# Patient Record
Sex: FEMALE | Race: WHITE | NOT HISPANIC OR LATINO | Employment: FULL TIME | URBAN - METROPOLITAN AREA
[De-identification: names, ages, dates, MRNs, and addresses within clinical notes are randomized per-mention and may not be internally consistent; named-entity substitution may affect disease eponyms.]

---

## 2017-03-20 ENCOUNTER — HOSPITAL ENCOUNTER (EMERGENCY)
Facility: MEDICAL CENTER | Age: 42
End: 2017-03-20
Attending: EMERGENCY MEDICINE
Payer: COMMERCIAL

## 2017-03-20 VITALS
RESPIRATION RATE: 16 BRPM | HEART RATE: 96 BPM | HEIGHT: 59 IN | SYSTOLIC BLOOD PRESSURE: 119 MMHG | OXYGEN SATURATION: 99 % | TEMPERATURE: 97.7 F | BODY MASS INDEX: 25.2 KG/M2 | DIASTOLIC BLOOD PRESSURE: 70 MMHG | WEIGHT: 125 LBS

## 2017-03-20 DIAGNOSIS — K02.9 DENTAL CARIES: ICD-10-CM

## 2017-03-20 PROCEDURE — 99283 EMERGENCY DEPT VISIT LOW MDM: CPT

## 2017-03-20 RX ORDER — HYDROCODONE BITARTRATE AND ACETAMINOPHEN 5; 325 MG/1; MG/1
1-2 TABLET ORAL EVERY 6 HOURS PRN
Qty: 10 TAB | Refills: 0 | Status: SHIPPED | OUTPATIENT
Start: 2017-03-20 | End: 2018-02-02

## 2017-03-20 RX ORDER — PENICILLIN V POTASSIUM 500 MG/1
500 TABLET ORAL
Qty: 20 TAB | Refills: 0 | Status: SHIPPED | OUTPATIENT
Start: 2017-03-20 | End: 2017-03-25

## 2017-03-20 ASSESSMENT — PAIN SCALES - GENERAL: PAINLEVEL_OUTOF10: 0

## 2017-03-20 NOTE — ED AVS SNAPSHOT
OpenNews Access Code: [email or web address]  Expires: 4/19/2017 10:45 AM    Your email address is not on file at [facility].  Email Addresses are required for you to sign up for OpenNews, please contact [phone] to verify your personal information and to provide your email address prior to attempting to register for OpenNews.    Adriane Hickman  [address] [address]    Korut  A secure, online tool to manage your health information     [facility]’s OpenNews® is a secure, online tool that connects you to your personalized health information from the privacy of your home -- day or night - making it very easy for you to manage your healthcare. Once the activation process is completed, you can even access your medical information using the OpenNews artie, which is available for free in the Apple Artie store or Google Play store.     To learn more about OpenNews, visit [email or web address]    There are two levels of access available (as shown below):   My Chart Features  [facility] Primary Care Doctor [facility]  Specialists [facility]  Urgent  Care Non-[facility] Primary Care Doctor   Email your healthcare team securely and privately 24/7 X X X    Manage appointments: schedule your next appointment; view details of past/upcoming appointments X      Request prescription refills. X      View recent personal medical records, including lab and immunizations X X X X   View health record, including health history, allergies, medications X X X X   Read reports about your outpatient visits, procedures, consult and ER notes X X X X   See your discharge summary, which is a recap of your hospital and/or ER visit that includes your diagnosis, lab results, and care plan X X  X     How to register for Korut:  Once your e-mail address has been verified, follow the following steps to sign up for Korut.     1. Go to  [email or web address]  2. Click on the Sign Up Now box, which takes you to the New Member Sign Up  page. You will need to provide the following information:  a. Enter your [employer] Access Code exactly as it appears at the top of this page. (You will not need to use this code after you’ve completed the sign-up process. If you do not sign up before the expiration date, you must request a new code.)   b. Enter your date of birth.   c. Enter your home email address.   d. Click Submit, and follow the next screen’s instructions.  3. Create a [employer] ID. This will be your [employer] login ID and cannot be changed, so think of one that is secure and easy to remember.  4. Create a [employer] password. You can change your password at any time.  5. Enter your Password Reset Question and Answer. This can be used at a later time if you forget your password.   6. Enter your e-mail address. This allows you to receive e-mail notifications when new information is available in [employer].  7. Click Sign Up. You can now view your health information.    For assistance activating your [employer] account, call [phone]

## 2017-03-20 NOTE — ED AVS SNAPSHOT
3/20/2017          Adriane Hickman  [address]  [facility] [address]    Dear Adriane:    Critical access hospital wants to ensure your discharge home is safe and you or your loved ones have had all your questions answered regarding your care after you leave the hospital.    You may receive a telephone call within two days of your discharge.  This call is to make certain you understand your discharge instructions as well as ensure we provided you with the best care possible during your stay with us.     The call will only last approximately 3-5 minutes and will be done by a nurse.    Once again, we want to ensure your discharge home is safe and that you have a clear understanding of any next steps in your care.  If you have any questions or concerns, please do not hesitate to contact us, we are here for you.  Thank you for choosing [facility] for your healthcare needs.    Sincerely,    Anton Diaz    [facility]

## 2017-03-20 NOTE — DISCHARGE INSTRUCTIONS
Dental Caries  Dental caries (also called tooth decay) is the most common oral disease. It can occur at any age but is more common in children and young adults.   HOW DENTAL CARIES DEVELOPS   The process of decay begins when bacteria and foods (particularly sugars and starches) combine in your mouth to produce plaque. Plaque is a substance that sticks to the hard, outer surface of a tooth (enamel). The bacteria in plaque produce acids that attack enamel. These acids may also attack the root surface of a tooth (cementum) if it is exposed. Repeated attacks dissolve these surfaces and create holes in the tooth (cavities). If left untreated, the acids destroy the other layers of the tooth.   RISK FACTORS  · Frequent sipping of sugary beverages.    · Frequent snacking on sugary and starchy foods, especially those that easily get stuck in the teeth.    · Poor oral hygiene.    · Dry mouth.    · Substance abuse such as methamphetamine abuse.    · Broken or poor-fitting dental restorations.    · Eating disorders.    · Gastroesophageal reflux disease (GERD).    · Certain radiation treatments to the head and neck.  SYMPTOMS  In the early stages of dental caries, symptoms are seldom present. Sometimes white, chalky areas may be seen on the enamel or other tooth layers. In later stages, symptoms may include:  · Pits and holes on the enamel.  · Toothache after sweet, hot, or cold foods or drinks are consumed.  · Pain around the tooth.  · Swelling around the tooth.  DIAGNOSIS   Most of the time, dental caries is detected during a regular dental checkup. A diagnosis is made after a thorough medical and dental history is taken and the surfaces of your teeth are checked for signs of dental caries. Sometimes special instruments, such as lasers, are used to check for dental caries. Dental X-ray exams may be taken so that areas not visible to the eye (such as between the contact areas of the teeth) can be checked for cavities.    TREATMENT   If dental caries is in its early stages, it may be reversed with a fluoride treatment or an application of a remineralizing agent at the dental office. Thorough brushing and flossing at home is needed to aid these treatments. If it is in its later stages, treatment depends on the location and extent of tooth destruction:   · If a small area of the tooth has been destroyed, the destroyed area will be removed and cavities will be filled with a material such as gold, silver amalgam, or composite resin.    · If a large area of the tooth has been destroyed, the destroyed area will be removed and a cap (crown) will be fitted over the remaining tooth structure.    · If the center part of the tooth (pulp) is affected, a procedure called a root canal will be needed before a filling or crown can be placed.    · If most of the tooth has been destroyed, the tooth may need to be pulled (extracted).  HOME CARE INSTRUCTIONS  You can prevent, stop, or reverse dental caries at home by practicing good oral hygiene. Good oral hygiene includes:  · Thoroughly cleaning your teeth at least twice a day with a toothbrush and dental floss.    · Using a fluoride toothpaste. A fluoride mouth rinse may also be used if recommended by your dentist or health care provider.    · Restricting the amount of sugary and starchy foods and sugary liquids you consume.    · Avoiding frequent snacking on these foods and sipping of these liquids.    · Keeping regular visits with a dentist for checkups and cleanings.  PREVENTION   · Practice good oral hygiene.  · Consider a dental sealant. A dental sealant is a coating material that is applied by your dentist to the pits and grooves of teeth. The sealant prevents food from being trapped in them. It may protect the teeth for several years.  · Ask about fluoride supplements if you live in a community without fluorinated water or with water that has a low fluoride content. Use fluoride supplements  as directed by your dentist or health care provider.  · Allow fluoride varnish applications to teeth if directed by your dentist or health care provider.     This information is not intended to replace advice given to you by your health care provider. Make sure you discuss any questions you have with your health care provider.     Document Released: 09/09/2003 Document Revised: 01/08/2016 Document Reviewed: 12/20/2013  [employer] Interactive Patient Education ©2016 [employer]    Dental Extraction  A dental extraction is the removal (extraction) of a tooth. You may need to have a dental extraction if:   · You have tooth decay or gum disease.  · You have an infection (abscess).  · Room needs to be made for other teeth to grow in or to be aligned properly.  · Baby (primary) teeth are preventing adult (permanent) teeth from coming to the surface (erupting).  · You have a tooth fracture or fractures that are not repairable.  · You are going to be having radiation to your head and neck.  The type and length of procedure that you have depends on the reason for the extraction and the placement of the tooth or teeth that are being removed. The procedure may be:  · A simple extraction. This is done if the tooth is visible in the mouth and is above the gumline.  · A surgical extraction. This is done if the tooth has not come into the mouth or if the tooth is broken off below the gumline.  LET YOUR HEALTH CARE PROVIDER KNOW ABOUT:  · Any allergies you have.  · All medicines you are taking, including vitamins, herbs, eye drops, creams, and over-the-counter medicines.  · Previous problems you or members of your family have had with the use of anesthetics.  · Any blood disorders you have.  · Previous surgeries you have had.  · Any medical conditions you may have.  RISKS AND COMPLICATIONS  Generally, this is a safe procedure. However, problems may occur, including:  · Damage to surrounding teeth, nerves, tissues, or  structures.  · The blood clot does not form or stay in place where the tooth was removed. This causes the bones and nerves underneath to be exposed (dry socket). This can delay healing.  · Incomplete extraction of roots.  · Jawbone injury, pain, or weakness.  BEFORE THE PROCEDURE  · Ask your health care provider about:  ¨ Changing or stopping your regular medicines. This is especially important if you are taking diabetes medicines or blood thinners.  ¨ Taking medicines such as aspirin and ibuprofen. These medicines can thin your blood. Do not take these medicines before your procedure if your health care provider instructs you not to.  · Take medicines, such as antibiotic medicines, as directed by your health care provider.  · Follow instructions from your health care provider about eating or drinking restrictions.  · Plan to have someone take you home after the procedure.  · If you go home right after the procedure, plan to have someone with you for 24 hours.  PROCEDURE  · You may be given one or more of the following:  ¨ A medicine that helps you relax (sedative).  ¨ A medicine that numbs the area (local anesthetic).  ¨ A medicine that makes you fall asleep (general anesthetic).  · If you are having a simple extraction:  ¨ Your dentist will loosen the tooth with an instrument called an elevator.  ¨ Another instrument called forceps will be used to grasp the tooth and remove it from the socket.  ¨ The open socket will be cleaned.  ¨ Gauze will be placed in the socket to reduce bleeding.  · If you are having a surgical extraction:  ¨ Your dentist will make an incision in the gum.  ¨ Some of the bone around the tooth may need to be removed.  ¨ The tooth will be removed.  ¨ Stitches (sutures) may be required to close the area.  The procedure may vary among health care providers and hospitals.  AFTER THE PROCEDURE  · You may have gauze in your mouth where the tooth was removed. If directed by your health care provider,  apply gentle pressure on the gauze for up to one hour after the procedure. This will help to control bleeding.  · A blood clot should begin to form over the open socket. This is normal. Do not touch the area, and do not rinse it.  · You may be given medicines to help control pain and help your recovery.     This information is not intended to replace advice given to you by your health care provider. Make sure you discuss any questions you have with your health care provider.     Document Released: 12/18/2006 Document Revised: 05/03/2016 Document Reviewed: 12/14/2015  [employer] Interactive Patient Education ©2016 [employer]  Return if fever, vomiting or if no better in 12 hours.Toothache    Follow up with [facility]. Return if fever of facial swelling or if no better in 12 hours  Return if no better in 12 hours. Follow up with [facility] clinic. Telephone [phone].  Return if no better in 12 hours.    Your exam shows that your toothache is probably due to tooth decay and infection. Poor dental care is the main cause of this problem. Swelling and redness around a painful tooth often means you have a dental abscess.    Pain medicine and antibiotics can help reduce symptoms, but you will need to see a dentist within the next few days to have your problem properly treated. Fillings or root canal work may be needed to save your tooth. If the problem is severe, your tooth may need to be pulled. Please call your doctor or go to the emergency room  if you have a fever over 101F, can't swallow, or develop severe swelling.    [employer]® Patient Information ©2007 [employer].

## 2017-03-20 NOTE — ED AVS SNAPSHOT
Home Care Instructions                                                                                                                Adriane Hickman   MRN: [id number]    Department:  [facility], Emergency Dept   Date of Visit:  3/20/2017            [facility], Emergency Dept    [address]    [place] [address]    Phone:  [phone]      You were seen by     Addison Morrison M.D.      Your Diagnosis Was     Dental caries     K02.9       Follow-up Information     1. Follow up with [facility]. Schedule an appointment as soon as possible for a visit today.    Why:  go to [facility] now, immediately, today    Contact information    [address]  [address]  [facility] [address]  [phone]        Medication Information     Review all of your home medications and newly ordered medications with your primary doctor and/or pharmacist as soon as possible. Follow medication instructions as directed by your doctor and/or pharmacist.     Please keep your complete medication list with you and share with your physician. Update the information when medications are discontinued, doses are changed, or new medications (including over-the-counter products) are added; and carry medication information at all times in the event of emergency situations.               Medication List      START taking these medications        Instructions    Morning Afternoon Evening Bedtime    hydrocodone-acetaminophen 5-325 MG Tabs per tablet   Commonly known as:  NORCO        Take 1-2 Tabs by mouth every 6 hours as needed.   Dose:  1-2 Tab                        penicillin v potassium 500 MG Tabs   Commonly known as:  VEETID        Take 1 Tab by mouth 4 Times a Day,Before Meals and at Bedtime for 5 days.   Dose:  500 mg                             Where to Get Your Medications      You can get these medications from any pharmacy     Bring a paper prescription for each of these medications    -  hydrocodone-acetaminophen 5-325 MG Tabs per tablet  - penicillin v potassium 500 MG Tabs              Discharge Instructions       Dental Caries  Dental caries (also called tooth decay) is the most common oral disease. It can occur at any age but is more common in children and young adults.   HOW DENTAL CARIES DEVELOPS   The process of decay begins when bacteria and foods (particularly sugars and starches) combine in your mouth to produce plaque. Plaque is a substance that sticks to the hard, outer surface of a tooth (enamel). The bacteria in plaque produce acids that attack enamel. These acids may also attack the root surface of a tooth (cementum) if it is exposed. Repeated attacks dissolve these surfaces and create holes in the tooth (cavities). If left untreated, the acids destroy the other layers of the tooth.   RISK FACTORS  · Frequent sipping of sugary beverages.    · Frequent snacking on sugary and starchy foods, especially those that easily get stuck in the teeth.    · Poor oral hygiene.    · Dry mouth.    · Substance abuse such as methamphetamine abuse.    · Broken or poor-fitting dental restorations.    · Eating disorders.    · Gastroesophageal reflux disease (GERD).    · Certain radiation treatments to the head and neck.  SYMPTOMS  In the early stages of dental caries, symptoms are seldom present. Sometimes white, chalky areas may be seen on the enamel or other tooth layers. In later stages, symptoms may include:  · Pits and holes on the enamel.  · Toothache after sweet, hot, or cold foods or drinks are consumed.  · Pain around the tooth.  · Swelling around the tooth.  DIAGNOSIS   Most of the time, dental caries is detected during a regular dental checkup. A diagnosis is made after a thorough medical and dental history is taken and the surfaces of your teeth are checked for signs of dental caries. Sometimes special instruments, such as lasers, are used to check for dental caries. Dental X-ray exams may be  taken so that areas not visible to the eye (such as between the contact areas of the teeth) can be checked for cavities.   TREATMENT   If dental caries is in its early stages, it may be reversed with a fluoride treatment or an application of a remineralizing agent at the dental office. Thorough brushing and flossing at home is needed to aid these treatments. If it is in its later stages, treatment depends on the location and extent of tooth destruction:   · If a small area of the tooth has been destroyed, the destroyed area will be removed and cavities will be filled with a material such as gold, silver amalgam, or composite resin.    · If a large area of the tooth has been destroyed, the destroyed area will be removed and a cap (crown) will be fitted over the remaining tooth structure.    · If the center part of the tooth (pulp) is affected, a procedure called a root canal will be needed before a filling or crown can be placed.    · If most of the tooth has been destroyed, the tooth may need to be pulled (extracted).  HOME CARE INSTRUCTIONS  You can prevent, stop, or reverse dental caries at home by practicing good oral hygiene. Good oral hygiene includes:  · Thoroughly cleaning your teeth at least twice a day with a toothbrush and dental floss.    · Using a fluoride toothpaste. A fluoride mouth rinse may also be used if recommended by your dentist or health care provider.    · Restricting the amount of sugary and starchy foods and sugary liquids you consume.    · Avoiding frequent snacking on these foods and sipping of these liquids.    · Keeping regular visits with a dentist for checkups and cleanings.  PREVENTION   · Practice good oral hygiene.  · Consider a dental sealant. A dental sealant is a coating material that is applied by your dentist to the pits and grooves of teeth. The sealant prevents food from being trapped in them. It may protect the teeth for several years.  · Ask about fluoride supplements if  you live in a community without fluorinated water or with water that has a low fluoride content. Use fluoride supplements as directed by your dentist or health care provider.  · Allow fluoride varnish applications to teeth if directed by your dentist or health care provider.     This information is not intended to replace advice given to you by your health care provider. Make sure you discuss any questions you have with your health care provider.     Document Released: 09/09/2003 Document Revised: 01/08/2016 Document Reviewed: 12/20/2013  [employer] Interactive Patient Education ©2016 Else[employer] Inc.    Dental Extraction  A dental extraction is the removal (extraction) of a tooth. You may need to have a dental extraction if:   · You have tooth decay or gum disease.  · You have an infection (abscess).  · Room needs to be made for other teeth to grow in or to be aligned properly.  · Baby (primary) teeth are preventing adult (permanent) teeth from coming to the surface (erupting).  · You have a tooth fracture or fractures that are not repairable.  · You are going to be having radiation to your head and neck.  The type and length of procedure that you have depends on the reason for the extraction and the placement of the tooth or teeth that are being removed. The procedure may be:  · A simple extraction. This is done if the tooth is visible in the mouth and is above the gumline.  · A surgical extraction. This is done if the tooth has not come into the mouth or if the tooth is broken off below the gumline.  LET YOUR HEALTH CARE PROVIDER KNOW ABOUT:  · Any allergies you have.  · All medicines you are taking, including vitamins, herbs, eye drops, creams, and over-the-counter medicines.  · Previous problems you or members of your family have had with the use of anesthetics.  · Any blood disorders you have.  · Previous surgeries you have had.  · Any medical conditions you may have.  RISKS AND COMPLICATIONS  Generally, this is a  safe procedure. However, problems may occur, including:  · Damage to surrounding teeth, nerves, tissues, or structures.  · The blood clot does not form or stay in place where the tooth was removed. This causes the bones and nerves underneath to be exposed (dry socket). This can delay healing.  · Incomplete extraction of roots.  · Jawbone injury, pain, or weakness.  BEFORE THE PROCEDURE  · Ask your health care provider about:  ¨ Changing or stopping your regular medicines. This is especially important if you are taking diabetes medicines or blood thinners.  ¨ Taking medicines such as aspirin and ibuprofen. These medicines can thin your blood. Do not take these medicines before your procedure if your health care provider instructs you not to.  · Take medicines, such as antibiotic medicines, as directed by your health care provider.  · Follow instructions from your health care provider about eating or drinking restrictions.  · Plan to have someone take you home after the procedure.  · If you go home right after the procedure, plan to have someone with you for 24 hours.  PROCEDURE  · You may be given one or more of the following:  ¨ A medicine that helps you relax (sedative).  ¨ A medicine that numbs the area (local anesthetic).  ¨ A medicine that makes you fall asleep (general anesthetic).  · If you are having a simple extraction:  ¨ Your dentist will loosen the tooth with an instrument called an elevator.  ¨ Another instrument called forceps will be used to grasp the tooth and remove it from the socket.  ¨ The open socket will be cleaned.  ¨ Gauze will be placed in the socket to reduce bleeding.  · If you are having a surgical extraction:  ¨ Your dentist will make an incision in the gum.  ¨ Some of the bone around the tooth may need to be removed.  ¨ The tooth will be removed.  ¨ Stitches (sutures) may be required to close the area.  The procedure may vary among health care providers and hospitals.  AFTER THE  PROCEDURE  · You may have gauze in your mouth where the tooth was removed. If directed by your health care provider, apply gentle pressure on the gauze for up to one hour after the procedure. This will help to control bleeding.  · A blood clot should begin to form over the open socket. This is normal. Do not touch the area, and do not rinse it.  · You may be given medicines to help control pain and help your recovery.     This information is not intended to replace advice given to you by your health care provider. Make sure you discuss any questions you have with your health care provider.     Document Released: 12/18/2006 Document Revised: 05/03/2016 Document Reviewed: 12/14/2015  [employer] Interactive Patient Education ©2016 Elsevier Inc.  Return if fever, vomiting or if no better in 12 hours.Toothache    Follow up with [facility]. Return if fever of facial swelling or if no better in 12 hours  Return if no better in 12 hours. Follow up with [facility] clinic. Telephone [phone].  Return if no better in 12 hours.    Your exam shows that your toothache is probably due to tooth decay and infection. Poor dental care is the main cause of this problem. Swelling and redness around a painful tooth often means you have a dental abscess.    Pain medicine and antibiotics can help reduce symptoms, but you will need to see a dentist within the next few days to have your problem properly treated. Fillings or root canal work may be needed to save your tooth. If the problem is severe, your tooth may need to be pulled. Please call your doctor or go to the emergency room  if you have a fever over 101F, can't swallow, or develop severe swelling.    [employer]® Patient Information ©2007 [employer].            Patient Information     Patient Information    Following emergency treatment: all patient requiring follow-up care must return either to a private physician or a clinic if your condition worsens before you are able to obtain further  medical attention, please return to the emergency room.     Billing Information    At [facility], we work to make the billing process streamlined for our patients.  Our Representatives are here to answer any questions you may have regarding your hospital bill.  If you have insurance coverage and have supplied your insurance information to us, we will submit a claim to your insurer on your behalf.  Should you have any questions regarding your bill, we can be reached online or by phone as follows:  Online: You are able pay your bills online or live chat with our representatives about any billing questions you may have. We are here to help Monday - Friday from 8:00am to 7:30pm and 9:00am - 12:00pm on Saturdays.  Please visit [email or web address]  for more information.   Phone:  [phone] or [phone]    Please note that your emergency physician, surgeon, pathologist, radiologist, anesthesiologist, and other specialists are not employed by [facility] and will therefore bill separately for their services.  Please contact them directly for any questions concerning their bills at the numbers below:     Emergency Physician Services:  [phone]  [facility]:  [phone]  [facility]:  [phone]  [facility]:  [phone]    1. Your final bill may vary from the amount quoted upon discharge if all procedures are not complete at that time, or if your doctor has additional procedures of which we are not aware. You will receive an additional bill if you return to the Emergency Department at [facility] for suture removal regardless of the facility of which the sutures were placed.     2. Please arrange for settlement of this account at the emergency registration.    3. All self-pay accounts are due in full at the time of treatment.  If you are unable to meet this obligation then payment is expected within 4-5 days.     4. If you  have had radiology studies (CT, X-ray, Ultrasound, MRI), you have received a preliminary result during your emergency department visit. Please contact the radiology department [phone] to receive a copy of your final result. Please discuss the Final result with your primary physician or with the follow up physician provided.     Crisis Hotline:  [place] Crisis Hotline:  1-805-KFVETGB or 1-535.380.6509  [place] Crisis Hotline:    1-320.653.4691 or 558-146-8750         ED Discharge Follow Up Questions    1. In order to provide you with very good care, we would like to follow up with a phone call in the next few days.  May we have your permission to contact you?     YES /  NO    2. What is the best phone number to call you? (       )_____-__________    3. What is the best time to call you?      Morning  /  Afternoon  /  Evening                   Patient Signature:  ____________________________________________________________    Date:  ____________________________________________________________

## 2017-03-20 NOTE — ED NOTES
"Chief Complaint   Patient presents with   • Tooth Ache     left lower     Pt reports chronic pain with dental carries and is concerned with possible infection. Pt reports no dentist. Taking ibuprofen with no relief. Blood pressure 119/70, pulse 96, temperature 36.5 °C (97.7 °F), resp. rate 16, height 1.499 m (4' 11\"), weight 56.7 kg (125 lb), last menstrual period 02/23/2017, SpO2 99 %.    "

## 2018-01-29 ENCOUNTER — HOSPITAL ENCOUNTER (EMERGENCY)
Facility: MEDICAL CENTER | Age: 43
End: 2018-01-29
Attending: EMERGENCY MEDICINE
Payer: COMMERCIAL

## 2018-01-29 ENCOUNTER — APPOINTMENT (OUTPATIENT)
Dept: RADIOLOGY | Facility: MEDICAL CENTER | Age: 43
End: 2018-01-29
Attending: EMERGENCY MEDICINE
Payer: COMMERCIAL

## 2018-01-29 VITALS
TEMPERATURE: 97.9 F | HEIGHT: 59 IN | HEART RATE: 80 BPM | SYSTOLIC BLOOD PRESSURE: 110 MMHG | BODY MASS INDEX: 24.53 KG/M2 | RESPIRATION RATE: 18 BRPM | OXYGEN SATURATION: 99 % | DIASTOLIC BLOOD PRESSURE: 64 MMHG | WEIGHT: 121.69 LBS

## 2018-01-29 DIAGNOSIS — S22.32XA CLOSED FRACTURE OF ONE RIB OF LEFT SIDE, INITIAL ENCOUNTER: ICD-10-CM

## 2018-01-29 PROCEDURE — 99284 EMERGENCY DEPT VISIT MOD MDM: CPT

## 2018-01-29 PROCEDURE — 71046 X-RAY EXAM CHEST 2 VIEWS: CPT

## 2018-01-29 RX ORDER — OXYCODONE HYDROCHLORIDE AND ACETAMINOPHEN 5; 325 MG/1; MG/1
1-2 TABLET ORAL EVERY 4 HOURS PRN
Qty: 15 TAB | Refills: 0 | Status: SHIPPED | OUTPATIENT
Start: 2018-01-29 | End: 2018-02-02

## 2018-01-29 ASSESSMENT — PAIN SCALES - GENERAL
PAINLEVEL_OUTOF10: 2
PAINLEVEL_OUTOF10: 3

## 2018-01-29 NOTE — ED PROVIDER NOTES
"ED Provider Note    CHIEF COMPLAINT  Chief Complaint   Patient presents with   • Rib Pain     Left side.  Reports getting hit by shoulder while playing (roughhousing) and hearing \"pop\"       HPI  Adriane Hickman is a 42 y.o. female who presents to the emergency department with left-sided chest pain. The patient states that she was roughhousing yesterday when she was hit with a shoulder underneath her left breast. She has localized discomfort in this distribution. She does have pain with inspiration. She has not had any recent fever nor cough. She denies abdominal pain. The pain is worse with inspiration and better when she stays still. Currently the pain is moderate to severe in intensity.    REVIEW OF SYSTEMS  See HPI for further details. All other systems are negative.     PAST MEDICAL HISTORY  History reviewed. No pertinent past medical history.    SOCIAL HISTORY  Social History     Social History   • Marital status: Single     Spouse name: N/A   • Number of children: N/A   • Years of education: N/A     Social History Main Topics   • Smoking status: Current Some Day Smoker     Packs/day: 1.00     Types: Cigarettes   • Smokeless tobacco: Never Used   • Alcohol use Yes      Comment: rare   • Drug use: Yes     Types: Inhaled      Comment: tch   • Sexual activity: Not on file     Other Topics Concern   • Not on file     Social History Narrative   • No narrative on file           PHYSICAL EXAM  VITAL SIGNS: /76   Pulse 91   Temp 36.4 °C (97.5 °F) (Temporal)   Resp 18   Ht 1.499 m (4' 11\")   Wt 55.2 kg (121 lb 11.1 oz)   SpO2 100%   BMI 24.58 kg/m²   Constitutional: Mild acute distress, Non-toxic appearance.   HENT: Normocephalic, Atraumatic, tympanic membranes are intact and nonerythematous bilaterally, Oropharynx moist without exudates or erythema, Nose normal.   Eyes: PERRLA, EOMI, Conjunctiva normal.  Neck: Supple without meningismus  Lymphatic: No lymphadenopathy noted.   Cardiovascular: Normal heart " rate, Normal rhythm, No murmurs, No rubs, No gallops.   Thorax & Lungs: Slight splinting otherwise Normal breath sounds, No respiratory distress, No wheezing, left anterior chest tenderness.   Abdomen: Bowel sounds normal, Soft, No tenderness, no rebound, no guarding, no distention, No masses, No pulsatile masses.   Skin: Warm, Dry, No erythema, No rash.   Back: No tenderness, No CVA tenderness.   ance, Normal sphincter tone. No external or internal lesions noted. Stool is normal color and heme negative.   Extremities: Atraumatic with symmetric distal pulses, No edema, No tenderness, No cyanosis, No clubbing.   Neurologic: GCS of 15  Psychiatric: Affect normal, Judgment normal, Mood normal.       RADIOLOGY/PROCEDURES  DX-CHEST-2 VIEWS   Final Result      Negative two views of the chest.            COURSE & MEDICAL DECISION MAKING  Pertinent Labs & Imaging studies reviewed. (See chart for details)  This a 42-year-old female who presents the emergency department with anterior chest discomfort after traumatic injury. I suspect she does have an occult rib fracture based on her tenderness. Chest x-ray does not show any evidence of a pneumothorax nor pulmonary contusion. Therefore we'll treat the patient with Percocet as well as Motrin. I did discuss the risks of the narcotics with the patient and she will sign an informed consent. I also performed a narcotic check. The patient be discharged with instructions to return for increased pain or increased work of breathing.    FINAL IMPRESSION  1. Left-sided rib fracture       Disposition  The patient will be discharged in stable condition    Electronically signed by: Arnold Garcia, 1/29/2018 8:42 AM

## 2018-01-29 NOTE — ED NOTES
Discharge instructions and prescription of percocet provided. Pt verbalized understanding. Pt vital signs stable. Questions and concerns addressed. Patient instructed to not drive or operate vehicles. Patient ambulated steadily out of department without difficulty.

## 2018-01-29 NOTE — DISCHARGE INSTRUCTIONS
Rib Fracture  A rib fracture is a break or crack in one of the bones of the ribs. The ribs are a group of long, curved bones that wrap around your chest and attach to your spine. They protect your lungs and other organs in the chest cavity. A broken or cracked rib is often painful, but most do not cause other problems. Most rib fractures heal on their own over time. However, rib fractures can be more serious if multiple ribs are broken or if broken ribs move out of place and push against other structures.  CAUSES   · A direct blow to the chest. For example, this could happen during contact sports, a car accident, or a fall against a hard object.  · Repetitive movements with high force, such as pitching a baseball or having severe coughing spells.  SYMPTOMS   · Pain when you breathe in or cough.  · Pain when someone presses on the injured area.  DIAGNOSIS   Your caregiver will perform a physical exam. Various imaging tests may be ordered to confirm the diagnosis and to look for related injuries. These tests may include a chest X-ray, computed tomography (CT), magnetic resonance imaging (MRI), or a bone scan.  TREATMENT   Rib fractures usually heal on their own in 1-3 months. The longer healing period is often associated with a continued cough or other aggravating activities. During the healing period, pain control is very important. Medication is usually given to control pain. Hospitalization or surgery may be needed for more severe injuries, such as those in which multiple ribs are broken or the ribs have moved out of place.   HOME CARE INSTRUCTIONS   · Avoid strenuous activity and any activities or movements that cause pain. Be careful during activities and avoid bumping the injured rib.  · Gradually increase activity as directed by your caregiver.  · Only take over-the-counter or prescription medications as directed by your caregiver. Do not take other medications without asking your caregiver first.  · Apply ice  to the injured area for the first 1-2 days after you have been treated or as directed by your caregiver. Applying ice helps to reduce inflammation and pain.  ¨ Put ice in a plastic bag.  ¨ Place a towel between your skin and the bag.    ¨ Leave the ice on for 15-20 minutes at a time, every 2 hours while you are awake.  · Perform deep breathing as directed by your caregiver. This will help prevent pneumonia, which is a common complication of a broken rib. Your caregiver may instruct you to:  ¨ Take deep breaths several times a day.  ¨ Try to cough several times a day, holding a pillow against the injured area.  ¨ Use a device called an incentive spirometer to practice deep breathing several times a day.  · Drink enough fluids to keep your urine clear or pale yellow. This will help you avoid constipation.    · Do not wear a rib belt or binder. These restrict breathing, which can lead to pneumonia.    SEEK IMMEDIATE MEDICAL CARE IF:   · You have a fever.    · You have difficulty breathing or shortness of breath.    · You develop a continual cough, or you cough up thick or bloody sputum.  · You feel sick to your stomach (nausea), throw up (vomit), or have abdominal pain.    · You have worsening pain not controlled with medications.    MAKE SURE YOU:  · Understand these instructions.  · Will watch your condition.  · Will get help right away if you are not doing well or get worse.     This information is not intended to replace advice given to you by your health care provider. Make sure you discuss any questions you have with your health care provider.     Document Released: 12/18/2006 Document Revised: 08/20/2014 Document Reviewed: 02/19/2014  [employer] Interactive Patient Education ©2016 [employer]

## 2018-01-29 NOTE — ED TRIAGE NOTES
"Adriane Hickman 42 y.o. female   Ambulatory to triage.    Chief Complaint   Patient presents with   • Rib Pain     Left side.  Reports getting hit by shoulder while playing (roughhousing) and hearing \"pop\"      Pt returned to lobby and educated on triage process.  Advised to notify RN with changes or concerns.    "

## 2018-02-02 ENCOUNTER — HOSPITAL ENCOUNTER (EMERGENCY)
Facility: MEDICAL CENTER | Age: 43
End: 2018-02-02
Attending: EMERGENCY MEDICINE
Payer: COMMERCIAL

## 2018-02-02 ENCOUNTER — APPOINTMENT (OUTPATIENT)
Dept: RADIOLOGY | Facility: MEDICAL CENTER | Age: 43
End: 2018-02-02
Attending: EMERGENCY MEDICINE
Payer: COMMERCIAL

## 2018-02-02 VITALS
DIASTOLIC BLOOD PRESSURE: 65 MMHG | OXYGEN SATURATION: 96 % | TEMPERATURE: 98.1 F | HEIGHT: 59 IN | WEIGHT: 115.3 LBS | RESPIRATION RATE: 17 BRPM | HEART RATE: 85 BPM | BODY MASS INDEX: 23.24 KG/M2 | SYSTOLIC BLOOD PRESSURE: 106 MMHG

## 2018-02-02 DIAGNOSIS — Z72.0 TOBACCO CONSUMPTION: ICD-10-CM

## 2018-02-02 DIAGNOSIS — J40 BRONCHITIS: ICD-10-CM

## 2018-02-02 LAB
FLUAV+FLUBV AG SPEC QL IA: NORMAL
SIGNIFICANT IND 70042: NORMAL
SITE SITE: NORMAL
SOURCE SOURCE: NORMAL

## 2018-02-02 PROCEDURE — 99284 EMERGENCY DEPT VISIT MOD MDM: CPT

## 2018-02-02 PROCEDURE — 71046 X-RAY EXAM CHEST 2 VIEWS: CPT

## 2018-02-02 PROCEDURE — 87400 INFLUENZA A/B EACH AG IA: CPT

## 2018-02-02 RX ORDER — PENICILLIN V POTASSIUM 500 MG/1
500 TABLET ORAL EVERY 6 HOURS
COMMUNITY
Start: 2018-01-19

## 2018-02-02 RX ORDER — IBUPROFEN 200 MG
800 TABLET ORAL EVERY 6 HOURS PRN
COMMUNITY

## 2018-02-02 RX ORDER — ALBUTEROL SULFATE 90 UG/1
2 AEROSOL, METERED RESPIRATORY (INHALATION) EVERY 6 HOURS PRN
Qty: 8.5 G | Refills: 0 | Status: SHIPPED | OUTPATIENT
Start: 2018-02-02

## 2018-02-02 RX ORDER — AMOXICILLIN AND CLAVULANATE POTASSIUM 875; 125 MG/1; MG/1
1 TABLET, FILM COATED ORAL 2 TIMES DAILY
Qty: 20 TAB | Refills: 0 | Status: SHIPPED | OUTPATIENT
Start: 2018-02-02 | End: 2018-02-12

## 2018-02-02 RX ORDER — PREDNISONE 10 MG/1
TABLET ORAL
Qty: 32 TAB | Refills: 0 | Status: SHIPPED | OUTPATIENT
Start: 2018-02-02

## 2018-02-02 ASSESSMENT — PAIN SCALES - GENERAL: PAINLEVEL_OUTOF10: 7

## 2018-02-02 NOTE — ED NOTES
"Chief Complaint   Patient presents with   • Rib Pain   • Cough   • Nausea   • Weakness   Productive cough x 5 days. Also injured left ribs \" playing around\". Denies abuse. Mask applied.    "

## 2018-02-02 NOTE — ED PROVIDER NOTES
ED Provider Note    CHIEF COMPLAINT  Chief Complaint   Patient presents with   • Rib Pain   • Cough   • Nausea   • Weakness       HPI  Adriane Hickman is a 42 y.o. female who presents complaining of left lower rib pain, coughing and shortness of breath intermittently for the last 3 days. The patient states that she has been feeling ill since Tuesday night with a cough and left lower rib pain. She states her cough is productive, but she has not noticed what she is coughing up. She has a history of smoking a pack a day for the last 25 years. She is not on oxygen or inhalers at home. She denies any leg swelling. She states she has had fevers intermittently along with body aches and sweats. She does have a mild sore throat as well. Her son was sick at home with similar symptoms, but he improved. The patient states that she just finished a course of penicillin yesterday for a bad tooth.    REVIEW OF SYSTEMS    HEENT:  No ear pain, congestion or sore throat   EYES: no discharge redness or vision changes  CARDIAC: See history of present illness  PULMONARY: See history of present illness  GI: no vomiting diarrhea or abdominal pain   : no dysuria, back pain or hematuria   Neuro: no weakness, numbness aphasia or headache  Musculoskeletal: no swelling deformity or pain no joint swelling  Endocrine: Ositive fevers, no sweating, weight loss   SKIN: no rash, erythema or contusions     See history of present illness all other systems are negative      PAST MEDICAL HISTORY  No past medical history on file.    FAMILY HISTORY  No family history on file.    SOCIAL HISTORY  Social History     Social History   • Marital status: Single     Spouse name: N/A   • Number of children: N/A   • Years of education: N/A     Social History Main Topics   • Smoking status: Current Some Day Smoker     Packs/day: 1.00     Types: Cigarettes   • Smokeless tobacco: Never Used   • Alcohol use Yes      Comment: rare   • Drug use: Yes     Types: Inhaled  "     Comment: [facility]   • Sexual activity: Not on file     Other Topics Concern   • Not on file     Social History Narrative   • No narrative on file       SURGICAL HISTORY  No past surgical history on file.    CURRENT MEDICATIONS  Home Medications     Reviewed by Todd Nazario (Pharmacy Tech) on 02/02/18 at 1540  Med List Status: Complete   Medication Last Dose Status   ibuprofen (MOTRIN) 200 MG Tab 2/1/2018 Active   penicillin v potassium (VEETID) 500 MG Tab 1/29/2018 Active                ALLERGIES  Allergies   Allergen Reactions   • Iodine Hives       PHYSICAL EXAM  VITAL SIGNS: /65   Pulse 85   Temp 36.7 °C (98.1 °F)   Resp 17   Ht 1.499 m (4' 11\")   Wt 52.3 kg (115 lb 4.8 oz)   SpO2 96%   BMI 23.29 kg/m²  Room air O2: 96    Constitutional :  Well developed, Well nourished, No acute distress, Non-toxic appearance.   HENT: No rhinorrhea and mucosal edema. Pharynx is mildly erythematous.   Eyes: PERRLA, EOMI, Conjunctiva normal, No discharge.   Neck: Normal range of motion, No tenderness, Supple, No stridor.   Lymphatic: Lymphadenopathy.   Cardiovascular: Normal heart rate, Normal rhythm, No murmurs, No rubs, No gallops.   Thorax & Lungs: Ositive for a coarse cough. Lungs are clear to auscultation bilaterally without any wheezes, rales or rhonchi. Speaking full sentences without respiratory distress  Skin: Warm, Dry, No erythema, No rash.   Extremities: Intact distal pulses, No edema, No tenderness, No cyanosis, No clubbing.       RADIOLOGY/PROCEDURES  DX-CHEST-2 VIEWS   Final Result      No radiographic evidence of acute cardiopulmonary process.            COURSE & MEDICAL DECISION MAKING  Pertinent Labs & Imaging studies reviewed. (See chart for details)  Differential diagnosis: URI, bronchitis, influenza, pneumonia    Results for orders placed or performed during the hospital encounter of 02/02/18   INFLUENZA RAPID   Result Value Ref Range    Significant Indicator NEG     Source RESP     Site " RESPIRATORY     Rapid Influenza A-B       Negative for Influenza A and Influenza B antigens.  Infection due to influenza A or B cannot be ruled out  since the antigen present in the specimen may be below the  detection limit of the test. Culture confirmation of  negative samples is recommended.          The patient's influenza is negative as well as her chest x-ray. I believe she has bronchitis. Because she smokes. I will place her on an antibiotic and steroid and an inhaler. I advised her to rest, drink plenty of fluids and follow-up with the [facility] clinic to establish care for recheck. If she becomes worsening with pain and shortness of breath, she should return for further evaluation.    [address] [address]  [phone]  Call in 1 day  for recheck, to establish care    Current Outpatient Prescriptions   Medication Sig Dispense Refill   • ibuprofen (MOTRIN) 200 MG Tab Take 800 mg by mouth every 6 hours as needed.     • penicillin v potassium (VEETID) 500 MG Tab Take 500 mg by mouth every 6 hours. 10 day course started 1/19/18 - completed     • amoxicillin-clavulanate (AUGMENTIN) 875-125 MG Tab Take 1 Tab by mouth 2 times a day for 10 days. 20 Tab 0   • albuterol 108 (90 Base) MCG/ACT Aero Soln inhalation aerosol Inhale 2 Puffs by mouth every 6 hours as needed for Shortness of Breath. 8.5 g 0   • predniSONE (DELTASONE) 10 MG Tab Take 4 tabs (40 mg) po daily on days 1-3, then take 3 tabs (30 mg) po daily on days 4-6, then take 2 tabs (20 mg) po daily on days 7-9, then take 1 tab (10 mg) po daily on days 10-12, then take 1/2 tab (5 mg) po daily on days 13-15. 32 Tab 0         FINAL IMPRESSION  1. Bronchitis    2. Tobacco consumption            Electronically signed by: Alejandrina Green, 2/2/2018

## 2018-02-03 ENCOUNTER — PATIENT OUTREACH (OUTPATIENT)
Dept: HEALTH INFORMATION MANAGEMENT | Facility: OTHER | Age: 43
End: 2018-02-03

## 2018-02-03 NOTE — PROGRESS NOTES
Placed discharge outreach phone call to patient s/p ER discharge 2/2/18.  Left voicemail providing my contact information and instructions to call with any questions or concerns.

## 2018-02-03 NOTE — DISCHARGE INSTRUCTIONS
Bronchitis  Bronchitis is the body's way of reacting to injury and/or infection (inflammation) of the bronchi. Bronchi are the air tubes that extend from the windpipe into the lungs. If the inflammation becomes severe, it may cause shortness of breath.  CAUSES   Inflammation may be caused by:  · A virus.  · Germs (bacteria).  · Dust.  · Allergens.  · Pollutants and many other irritants.  The cells lining the bronchial tree are covered with tiny hairs (cilia). These constantly beat upward, away from the lungs, toward the mouth. This keeps the lungs free of pollutants. When these cells become too irritated and are unable to do their job, mucus begins to develop. This causes the characteristic cough of bronchitis. The cough clears the lungs when the cilia are unable to do their job. Without either of these protective mechanisms, the mucus would settle in the lungs. Then you would develop pneumonia.  Smoking is a common cause of bronchitis and can contribute to pneumonia. Stopping this habit is the single most important thing you can do to help yourself.  TREATMENT   · Your caregiver may prescribe an antibiotic if the cough is caused by bacteria. Also, medicines that open up your airways make it easier to breathe. Your caregiver may also recommend or prescribe an expectorant. It will loosen the mucus to be coughed up. Only take over-the-counter or prescription medicines for pain, discomfort, or fever as directed by your caregiver.  · Removing whatever causes the problem (smoking, for example) is critical to preventing the problem from getting worse.  · Cough suppressants may be prescribed for relief of cough symptoms.  · Inhaled medicines may be prescribed to help with symptoms now and to help prevent problems from returning.  · For those with recurrent (chronic) bronchitis, there may be a need for steroid medicines.  SEEK IMMEDIATE MEDICAL CARE IF:   · During treatment, you develop more pus-like mucus (purulent  sputum).  · You have a fever.  · Your baby is older than 3 months with a rectal temperature of 102° F (38.9° C) or higher.  · Your baby is 3 months old or younger with a rectal temperature of 100.4° F (38° C) or higher.  · You become progressively more ill.  · You have increased difficulty breathing, wheezing, or shortness of breath.  It is necessary to seek immediate medical care if you are elderly or sick from any other disease.  MAKE SURE YOU:   · Understand these instructions.  · Will watch your condition.  · Will get help right away if you are not doing well or get worse.  Document Released: 12/18/2006 Document Revised: 03/11/2013 Document Reviewed: 10/27/2009  [employer]® Patient Information ©2014 [employer].    Smoking Cessation, Tips for Success  If you are ready to quit smoking, congratulations! You have chosen to help yourself be healthier. Cigarettes bring nicotine, tar, carbon monoxide, and other irritants into your body. Your lungs, heart, and blood vessels will be able to work better without these poisons. There are many different ways to quit smoking. Nicotine gum, nicotine patches, a nicotine inhaler, or nicotine nasal spray can help with physical craving. Hypnosis, support groups, and medicines help break the habit of smoking.  WHAT THINGS CAN I DO TO MAKE QUITTING EASIER?   Here are some tips to help you quit for good:  · Pick a date when you will quit smoking completely. Tell all of your friends and family about your plan to quit on that date.  · Do not try to slowly cut down on the number of cigarettes you are smoking. Pick a quit date and quit smoking completely starting on that day.  · Throw away all cigarettes.    · Clean and remove all ashtrays from your home, work, and car.  · On a card, write down your reasons for quitting. Carry the card with you and read it when you get the urge to smoke.  · Cleanse your body of nicotine. Drink enough water and fluids to keep your urine clear or pale  "yellow. Do this after quitting to flush the nicotine from your body.  · Learn to predict your moods. Do not let a bad situation be your excuse to have a cigarette. Some situations in your life might tempt you into wanting a cigarette.  · Never have \"just one\" cigarette. It leads to wanting another and another. Remind yourself of your decision to quit.  · Change habits associated with smoking. If you smoked while driving or when feeling stressed, try other activities to replace smoking. Stand up when drinking your coffee. Brush your teeth after eating. Sit in a different chair when you read the paper. Avoid alcohol while trying to quit, and try to drink fewer caffeinated beverages. Alcohol and caffeine may urge you to smoke.  · Avoid foods and drinks that can trigger a desire to smoke, such as sugary or spicy foods and alcohol.  · Ask people who smoke not to smoke around you.  · Have something planned to do right after eating or having a cup of coffee. For example, plan to take a walk or exercise.  · Try a relaxation exercise to calm you down and decrease your stress. Remember, you may be tense and nervous for the first 2 weeks after you quit, but this will pass.  · Find new activities to keep your hands busy. Play with a pen, coin, or rubber band. Doodle or draw things on paper.  · Brush your teeth right after eating. This will help cut down on the craving for the taste of tobacco after meals. You can also try mouthwash.    · Use oral substitutes in place of cigarettes. Try using lemon drops, carrots, cinnamon sticks, or chewing gum. Keep them handy so they are available when you have the urge to smoke.  · When you have the urge to smoke, try deep breathing.  · Designate your home as a nonsmoking area.  · If you are a heavy smoker, ask your health care provider about a prescription for nicotine chewing gum. It can ease your withdrawal from nicotine.  · Reward yourself. Set aside the cigarette money you save and buy " "yourself something nice.  · Look for support from others. Join a support group or smoking cessation program. Ask someone at home or at work to help you with your plan to quit smoking.  · Always ask yourself, \"Do I need this cigarette or is this just a reflex?\" Tell yourself, \"Today, I choose not to smoke,\" or \"I do not want to smoke.\" You are reminding yourself of your decision to quit.  · Do not replace cigarette smoking with electronic cigarettes (commonly called e-cigarettes). The safety of e-cigarettes is unknown, and some may contain harmful chemicals.  · If you relapse, do not give up! Plan ahead and think about what you will do the next time you get the urge to smoke.  HOW WILL I FEEL WHEN I QUIT SMOKING?  You may have symptoms of withdrawal because your body is used to nicotine (the addictive substance in cigarettes). You may crave cigarettes, be irritable, feel very hungry, cough often, get headaches, or have difficulty concentrating. The withdrawal symptoms are only temporary. They are strongest when you first quit but will go away within 10-14 days. When withdrawal symptoms occur, stay in control. Think about your reasons for quitting. Remind yourself that these are signs that your body is healing and getting used to being without cigarettes. Remember that withdrawal symptoms are easier to treat than the major diseases that smoking can cause.   Even after the withdrawal is over, expect periodic urges to smoke. However, these cravings are generally short lived and will go away whether you smoke or not. Do not smoke!  WHAT RESOURCES ARE AVAILABLE TO HELP ME QUIT SMOKING?  Your health care provider can direct you to community resources or hospitals for support, which may include:  · Group support.  · Education.  · Hypnosis.  · Therapy.     This information is not intended to replace advice given to you by your health care provider. Make sure you discuss any questions you have with your health care " provider.     Document Released: 09/15/2005 Document Revised: 01/08/2016 Document Reviewed: 06/05/2014  [employer] Interactive Patient Education ©2016 [employer]

## 2022-08-04 NOTE — ED NOTES
Chief Complaint   Patient presents with     Labs Only     Noncoring powerport 20 gauge, 3/4 inch needle accessed and heparin flushed, UA obtained and sent to lab.        The Medication Reconciliation process has been completed by interviewing the patient and a call to her pharmacy    Allergies have been reviewed  Antibiotic use in 30 days - PCN completed 1/29/18     Home Pharmacy:  [facility]